# Patient Record
Sex: MALE | Race: BLACK OR AFRICAN AMERICAN | NOT HISPANIC OR LATINO | Employment: OTHER | URBAN - METROPOLITAN AREA
[De-identification: names, ages, dates, MRNs, and addresses within clinical notes are randomized per-mention and may not be internally consistent; named-entity substitution may affect disease eponyms.]

---

## 2021-01-21 ENCOUNTER — HOSPITAL ENCOUNTER (EMERGENCY)
Facility: MEDICAL CENTER | Age: 51
End: 2021-01-22
Attending: EMERGENCY MEDICINE

## 2021-01-21 DIAGNOSIS — M54.42 ACUTE LEFT-SIDED LOW BACK PAIN WITH LEFT-SIDED SCIATICA: ICD-10-CM

## 2021-01-21 DIAGNOSIS — M54.32 SCIATICA OF LEFT SIDE: ICD-10-CM

## 2021-01-21 PROCEDURE — 96372 THER/PROPH/DIAG INJ SC/IM: CPT

## 2021-01-21 PROCEDURE — 99284 EMERGENCY DEPT VISIT MOD MDM: CPT

## 2021-01-21 RX ORDER — CYCLOBENZAPRINE HCL 10 MG
10 TABLET ORAL ONCE
Status: COMPLETED | OUTPATIENT
Start: 2021-01-22 | End: 2021-01-22

## 2021-01-21 RX ORDER — KETOROLAC TROMETHAMINE 30 MG/ML
60 INJECTION, SOLUTION INTRAMUSCULAR; INTRAVENOUS ONCE
Status: COMPLETED | OUTPATIENT
Start: 2021-01-22 | End: 2021-01-22

## 2021-01-21 SDOH — HEALTH STABILITY: MENTAL HEALTH: HOW OFTEN DO YOU HAVE A DRINK CONTAINING ALCOHOL?: MONTHLY OR LESS

## 2021-01-22 ENCOUNTER — APPOINTMENT (OUTPATIENT)
Dept: RADIOLOGY | Facility: MEDICAL CENTER | Age: 51
End: 2021-01-22
Attending: EMERGENCY MEDICINE

## 2021-01-22 VITALS
HEART RATE: 90 BPM | OXYGEN SATURATION: 94 % | WEIGHT: 190 LBS | TEMPERATURE: 97.8 F | RESPIRATION RATE: 16 BRPM | BODY MASS INDEX: 25.18 KG/M2 | SYSTOLIC BLOOD PRESSURE: 154 MMHG | HEIGHT: 73 IN | DIASTOLIC BLOOD PRESSURE: 90 MMHG

## 2021-01-22 PROCEDURE — 72100 X-RAY EXAM L-S SPINE 2/3 VWS: CPT

## 2021-01-22 PROCEDURE — 700102 HCHG RX REV CODE 250 W/ 637 OVERRIDE(OP): Performed by: EMERGENCY MEDICINE

## 2021-01-22 PROCEDURE — 73501 X-RAY EXAM HIP UNI 1 VIEW: CPT | Mod: LT

## 2021-01-22 PROCEDURE — A9270 NON-COVERED ITEM OR SERVICE: HCPCS | Performed by: EMERGENCY MEDICINE

## 2021-01-22 PROCEDURE — 700111 HCHG RX REV CODE 636 W/ 250 OVERRIDE (IP): Performed by: EMERGENCY MEDICINE

## 2021-01-22 RX ORDER — NAPROXEN 500 MG/1
500 TABLET ORAL 2 TIMES DAILY WITH MEALS
Qty: 20 TAB | Refills: 0 | Status: SHIPPED | OUTPATIENT
Start: 2021-01-22 | End: 2021-02-01

## 2021-01-22 RX ORDER — CYCLOBENZAPRINE HCL 10 MG
10 TABLET ORAL EVERY 8 HOURS PRN
Qty: 21 TAB | Refills: 0 | Status: SHIPPED | OUTPATIENT
Start: 2021-01-22

## 2021-01-22 RX ADMIN — CYCLOBENZAPRINE 10 MG: 10 TABLET, FILM COATED ORAL at 00:14

## 2021-01-22 RX ADMIN — KETOROLAC TROMETHAMINE 60 MG: 30 INJECTION, SOLUTION INTRAMUSCULAR at 00:13

## 2021-01-22 NOTE — ED NOTES
Discharge instructions and prescriptions discussed with pt. Pt verbalized understanding. Pt discharged ambualtory.

## 2021-01-22 NOTE — ED NOTES
Pt asleep in room. No complaints at this time. Stretcher moved to flat position without complaints.

## 2021-01-22 NOTE — ED TRIAGE NOTES
"Chief Complaint   Patient presents with   • Hip Pain     right hip pain radiating down leg making it difficult to bare weight       Denies trauma or injury.    BP (!) 163/110   Pulse (!) 101   Temp 36.6 °C (97.8 °F) (Temporal)   Resp 16   Ht 1.854 m (6' 1\")   Wt 86.2 kg (190 lb)   SpO2 96%   BMI 25.07 kg/m²     "
English

## 2021-01-22 NOTE — ED PROVIDER NOTES
ED Provider Note        Primary care provider: No primary care provider on file.    I verified that the patient was wearing a mask and I was wearing appropriate PPE every time I entered the room. The patient's mask was on the patient at all times during my encounter except for a brief view of the oropharynx.      CHIEF COMPLAINT  Chief Complaint   Patient presents with   • Hip Pain     right hip pain radiating down leg making it difficult to bare weight       HPI  Rubén James is a 50 y.o. male who presents to the Emergency Department with chief complaint of left hip and leg pain.  Patient reports that the pain began yesterday initially noticed the pain in the left posterior hip and stated that it radiated into his left lower back and also down his left leg and reports some numbness in the left lower extremity.  No saddle anesthesia no fecal incontinence no urinary retention abdominal pain no fevers chills no recent instrumentation or drug use.  Pain is rated as moderate is worse with ambulation is better with rest.  No headache altered mental status cough congestion chest pain shortness of breath no other acute symptoms or concerns at this time.    REVIEW OF SYSTEMS  10 systems reviewed and otherwise negative, pertinent positives and negatives listed in the history of present illness.    PAST MEDICAL HISTORY     Patient denies  SURGICAL HISTORY  patient denies any surgical history    SOCIAL HISTORY  Social History     Tobacco Use   • Smoking status: Current Every Day Smoker     Packs/day: 1.00     Types: Cigarettes   • Smokeless tobacco: Never Used   Substance Use Topics   • Alcohol use: Not Currently     Frequency: Monthly or less   • Drug use: Yes     Types: Inhaled     Comment: jules      Social History     Substance and Sexual Activity   Drug Use Yes   • Types: Inhaled    Comment: jules       FAMILY HISTORY  Non-Contributory    CURRENT MEDICATIONS  Home Medications     Reviewed by Maura NEVAREZ  "AGUEDA Richards (Registered Nurse) on 01/21/21 at 2314  Med List Status: <None>   Medication Last Dose Status        Patient Odell Taking any Medications                     ALLERGIES  No Known Allergies    PHYSICAL EXAM  VITAL SIGNS: BP (!) 163/110   Pulse (!) 101   Temp 36.6 °C (97.8 °F) (Temporal)   Resp 16   Ht 1.854 m (6' 1\")   Wt 86.2 kg (190 lb)   SpO2 96%   BMI 25.07 kg/m²   Pulse ox interpretation: I interpret this pulse ox as normal.  Constitutional: Alert and oriented x 3, normal distress  HEENT: Atraumatic normocephalic, pupils are equal round reactive to light extraocular movements are intact. The nares is clear, external ears are normal, mouth shows moist mucous membranes  Neck: Supple, no JVD no tracheal deviation  Cardiovascular: Regular rate and rhythm no murmur rub or gallop 2+ pulses peripherally x4  Thorax & Lungs: No respiratory distress, no wheezes rales or rhonchi, No chest tenderness.   GI: Soft nontender nondistended positive bowel sounds, no peritoneal signs    Skin: Warm dry no acute rash or lesion  Musculoskeletal: No midline tenderness or step-off throughout thoracic or lumbar spine.  Patient has minimal left lumbar paraspinal tenderness that radiates into the left buttock and down the left lateral leg.  He is moving all extremities with full range and strength there is no acute traumatic deformity  Neurologic: Cranial nerves III through XII are grossly intact, no sensory deficit, no cerebellar dysfunction 2+ DTRs bilateral patellar and Achilles tendons ambulates without difficulty  Psychiatric: Appropriate affect for situation at this time      DIAGNOSTIC STUDIES / PROCEDURES      RADIOLOGY  DX-HIP-UNILATERAL-WITH PELVIS-1 VIEW LEFT   Final Result         1.  No radiographic evidence of acute traumatic injury.      DX-LUMBAR SPINE-2 OR 3 VIEWS   Final Result         1.  No acute traumatic bony injury of the lumbar spine.        The radiologist's interpretation of all radiological " "studies have been reviewed by me.    COURSE & MEDICAL DECISION MAKING  Pertinent Labs & Imaging studies reviewed. (See chart for details)    11:37 PM - Patient seen and examined at bedside.         Patient noted to have slightly elevated blood pressure likely circumstantial secondary to presenting complaint. Referred to primary care physician for further evaluation.      Medical Decision Makin-year-old male presents with left-sided lower back pain radiating left buttock and leg benign abdominal exam no saddle anesthesia no numbness tingling weakness in his lower extremity no fevers no chills no other acute red flags of neurologic compromise or infectious process x-rays are negative as above was given Toradol and Flexeril and had near complete resolution of symptoms.  Patient did report that he did 1 ambulate prior to arrival but also asked me if he could stay in the room until the son came up I do suspect some component of malingering.  Patient given instructions to return for worsening back pain problems with urination bowel movements abdominal pain fevers chills nausea vomiting any other acute symptoms or concerns otherwise follow-up primary care discharged stable and improved condition.    BP (!) 163/110   Pulse (!) 101   Temp 36.6 °C (97.8 °F) (Temporal)   Resp 16   Ht 1.854 m (6' 1\")   Wt 86.2 kg (190 lb)   SpO2 96%   BMI 25.07 kg/m²     [address] [address] [phone]  Schedule an appointment as soon as possible for a visit   for establishment of primary care, for blood pressure management      Discharge Medication List as of 2021  5:11 AM      START taking these medications    Details   naproxen (NAPROSYN) 500 MG Tab Take 1 Tab by mouth 2 times a day, with meals for 10 days., Disp-20 Tab, R-0, Print Rx Paper      cyclobenzaprine (FLEXERIL) 10 mg Tab Take 1 Tab by mouth every 8 hours as needed., Disp-21 Tab, R-0, Print Rx Paper             FINAL " IMPRESSION  1. Sciatica of left side Active   2. Acute left-sided low back pain with left-sided sciatica Active          This dictation has been created using voice recognition software and/or scribes. The accuracy of the dictation is limited by the abilities of the software and the expertise of the scribes. I expect there may be some errors of grammar and possibly content. I made every attempt to manually correct the errors within my dictation. However, errors related to voice recognition software and/or scribes may still exist and should be interpreted within the appropriate context.

## 2021-01-22 NOTE — ED NOTES
Pt changed to gown. C/o left hip/leg pain, unable to bear weight. Hypertensive, otherwise VS normal. Awaiting MD jean.